# Patient Record
Sex: FEMALE | Race: BLACK OR AFRICAN AMERICAN | NOT HISPANIC OR LATINO | URBAN - METROPOLITAN AREA
[De-identification: names, ages, dates, MRNs, and addresses within clinical notes are randomized per-mention and may not be internally consistent; named-entity substitution may affect disease eponyms.]

---

## 2017-02-08 ENCOUNTER — EMERGENCY (EMERGENCY)
Facility: HOSPITAL | Age: 52
LOS: 1 days | Discharge: HOME | End: 2017-02-08
Attending: EMERGENCY MEDICINE
Payer: COMMERCIAL

## 2017-02-08 VITALS
HEIGHT: 65 IN | WEIGHT: 130.07 LBS | DIASTOLIC BLOOD PRESSURE: 60 MMHG | HEART RATE: 70 BPM | TEMPERATURE: 98 F | RESPIRATION RATE: 20 BRPM | OXYGEN SATURATION: 100 % | SYSTOLIC BLOOD PRESSURE: 139 MMHG

## 2017-02-08 DIAGNOSIS — F17.210 NICOTINE DEPENDENCE, CIGARETTES, UNCOMPLICATED: ICD-10-CM

## 2017-02-08 DIAGNOSIS — R10.84 GENERALIZED ABDOMINAL PAIN: ICD-10-CM

## 2017-02-08 DIAGNOSIS — R11.2 NAUSEA WITH VOMITING, UNSPECIFIED: ICD-10-CM

## 2017-02-08 LAB
ALBUMIN SERPL ELPH-MCNC: 4.9 G/DL — SIGNIFICANT CHANGE UP (ref 3.3–5.2)
ALP SERPL-CCNC: 73 U/L — SIGNIFICANT CHANGE UP (ref 40–120)
ALT FLD-CCNC: 24 U/L — SIGNIFICANT CHANGE UP
ANION GAP SERPL CALC-SCNC: 22 MMOL/L — HIGH (ref 5–17)
AST SERPL-CCNC: 34 U/L — HIGH
BASOPHILS # BLD AUTO: 0 K/UL — SIGNIFICANT CHANGE UP (ref 0–0.2)
BASOPHILS NFR BLD AUTO: 0.2 % — SIGNIFICANT CHANGE UP (ref 0–2)
BILIRUB SERPL-MCNC: 0.3 MG/DL — LOW (ref 0.4–2)
BUN SERPL-MCNC: 16 MG/DL — SIGNIFICANT CHANGE UP (ref 8–20)
CALCIUM SERPL-MCNC: 10.3 MG/DL — HIGH (ref 8.6–10.2)
CHLORIDE SERPL-SCNC: 103 MMOL/L — SIGNIFICANT CHANGE UP (ref 98–107)
CO2 SERPL-SCNC: 23 MMOL/L — SIGNIFICANT CHANGE UP (ref 22–29)
CREAT SERPL-MCNC: 0.66 MG/DL — SIGNIFICANT CHANGE UP (ref 0.5–1.3)
GLUCOSE SERPL-MCNC: 131 MG/DL — HIGH (ref 70–115)
HCG SERPL-ACNC: <2 MIU/ML — SIGNIFICANT CHANGE UP
HCT VFR BLD CALC: 42 % — SIGNIFICANT CHANGE UP (ref 37–47)
HGB BLD-MCNC: 14 G/DL — SIGNIFICANT CHANGE UP (ref 12–16)
LIDOCAIN IGE QN: 24 U/L — SIGNIFICANT CHANGE UP (ref 22–51)
LYMPHOCYTES # BLD AUTO: 1.8 K/UL — SIGNIFICANT CHANGE UP (ref 1–4.8)
LYMPHOCYTES # BLD AUTO: 11.3 % — LOW (ref 20–55)
MCHC RBC-ENTMCNC: 30.4 PG — SIGNIFICANT CHANGE UP (ref 27–31)
MCHC RBC-ENTMCNC: 33.3 G/DL — SIGNIFICANT CHANGE UP (ref 32–36)
MCV RBC AUTO: 91.1 FL — SIGNIFICANT CHANGE UP (ref 81–99)
MONOCYTES # BLD AUTO: 1.2 K/UL — HIGH (ref 0–0.8)
MONOCYTES NFR BLD AUTO: 7.4 % — SIGNIFICANT CHANGE UP (ref 3–10)
NEUTROPHILS # BLD AUTO: 12.9 K/UL — HIGH (ref 1.8–8)
NEUTROPHILS NFR BLD AUTO: 80.6 % — HIGH (ref 37–73)
PLATELET # BLD AUTO: 273 K/UL — SIGNIFICANT CHANGE UP (ref 150–400)
POTASSIUM SERPL-MCNC: 4.4 MMOL/L — SIGNIFICANT CHANGE UP (ref 3.5–5.3)
POTASSIUM SERPL-SCNC: 4.4 MMOL/L — SIGNIFICANT CHANGE UP (ref 3.5–5.3)
PROT SERPL-MCNC: 9 G/DL — HIGH (ref 6.6–8.7)
RBC # BLD: 4.61 M/UL — SIGNIFICANT CHANGE UP (ref 4.4–5.2)
RBC # FLD: 14.1 % — SIGNIFICANT CHANGE UP (ref 11–15.6)
SODIUM SERPL-SCNC: 148 MMOL/L — HIGH (ref 135–145)
WBC # BLD: 15.95 K/UL — HIGH (ref 4.8–10.8)
WBC # FLD AUTO: 15.95 K/UL — HIGH (ref 4.8–10.8)

## 2017-02-08 PROCEDURE — 74177 CT ABD & PELVIS W/CONTRAST: CPT | Mod: 26

## 2017-02-08 PROCEDURE — 99285 EMERGENCY DEPT VISIT HI MDM: CPT

## 2017-02-08 RX ORDER — SODIUM CHLORIDE 9 MG/ML
1000 INJECTION INTRAMUSCULAR; INTRAVENOUS; SUBCUTANEOUS ONCE
Qty: 0 | Refills: 0 | Status: COMPLETED | OUTPATIENT
Start: 2017-02-08 | End: 2017-02-08

## 2017-02-08 RX ORDER — MORPHINE SULFATE 50 MG/1
4 CAPSULE, EXTENDED RELEASE ORAL ONCE
Qty: 0 | Refills: 0 | Status: DISCONTINUED | OUTPATIENT
Start: 2017-02-08 | End: 2017-02-08

## 2017-02-08 RX ORDER — ONDANSETRON 8 MG/1
4 TABLET, FILM COATED ORAL ONCE
Qty: 0 | Refills: 0 | Status: COMPLETED | OUTPATIENT
Start: 2017-02-08 | End: 2017-02-08

## 2017-02-08 RX ORDER — PANTOPRAZOLE SODIUM 20 MG/1
40 TABLET, DELAYED RELEASE ORAL ONCE
Qty: 0 | Refills: 0 | Status: COMPLETED | OUTPATIENT
Start: 2017-02-08 | End: 2017-02-08

## 2017-02-08 RX ORDER — KETOROLAC TROMETHAMINE 30 MG/ML
15 SYRINGE (ML) INJECTION ONCE
Qty: 0 | Refills: 0 | Status: DISCONTINUED | OUTPATIENT
Start: 2017-02-08 | End: 2017-02-08

## 2017-02-08 RX ORDER — METOCLOPRAMIDE HCL 10 MG
10 TABLET ORAL ONCE
Qty: 0 | Refills: 0 | Status: COMPLETED | OUTPATIENT
Start: 2017-02-08 | End: 2017-02-08

## 2017-02-08 RX ADMIN — PANTOPRAZOLE SODIUM 40 MILLIGRAM(S): 20 TABLET, DELAYED RELEASE ORAL at 21:45

## 2017-02-08 RX ADMIN — SODIUM CHLORIDE 1000 MILLILITER(S): 9 INJECTION INTRAMUSCULAR; INTRAVENOUS; SUBCUTANEOUS at 21:12

## 2017-02-08 RX ADMIN — MORPHINE SULFATE 4 MILLIGRAM(S): 50 CAPSULE, EXTENDED RELEASE ORAL at 21:46

## 2017-02-08 RX ADMIN — MORPHINE SULFATE 4 MILLIGRAM(S): 50 CAPSULE, EXTENDED RELEASE ORAL at 21:11

## 2017-02-08 RX ADMIN — Medication 10 MILLIGRAM(S): at 21:11

## 2017-02-08 NOTE — ED PROVIDER NOTE - PROGRESS NOTE DETAILS
pain and nausea improved given PO challenge which was tolerated. Likely gastro. Will DC home with antiemetics. Return precautions given.

## 2017-02-08 NOTE — ED PROVIDER NOTE - PLAN OF CARE
You were seen and evaluated in the emergency department for nausea and vomiting and urinary frequency. You had a thorough evaluation including an exam, labs and imaging. You were given medications for comfort. Your workup did not demonstrate any concerning findings. You do not have a urinary tract infection. Most likely you have a stomach virus that will take several days to resolve. Please read the attached information sheets as they will provide useful information regarding your condition.    It is important to understand that while your workup was reassuring, no workup can completely exclude all concerning conditions. Therfore, please return if you develop worsening or concerning new symptoms including worsening pain, pain that spreads to new places, inability to tolerate an oral diet, new and worsening fevers and chills, weakness, inability to pass stool or flatulent gas, those included on the attached information sheets, or other findings concerning to you. Please take all your medications as prescribed.    Drink plenty of fluids. It is ok to have some vomiting and diarrhea as long as you are able to ekep down fluids and replace the fluids you have lost.  Please be sure to follow up with your regular doctor in the next 2 days.

## 2017-02-08 NOTE — ED STATDOCS - PROGRESS NOTE DETAILS
52 y/o F pt w/ no significant PMHx presents to the ED c/o N/V/D and abd pain onset today morning. Pt states that she has been vomiting greenish/bile. Pt notes going out drinking yesterday night. Pt denies fever, chills, or any other complaints. NKDA. Pt tender to epigastric region. R/o pancreatitis. Focused evaluation, protocol orders entered, placed in main for complete evaluation by another provider.

## 2017-02-08 NOTE — ED PROVIDER NOTE - OBJECTIVE STATEMENT
This is a 52 y/o F with no medical history presenting to the ED complaining of N/V/D since this morning. Pt states that she does not have abdominal pain but notes nausea. Denies coffee ground emesis, blood in vomit or stool. She also notes that she has the urge to urinate without burning. LNMP October. Admits to drinking EtOH last night, smoking marijuana, and cigarette use. This is a 52 y/o F with no medical history presenting to the ED complaining of N/V/D since this morning. Pt states that she does not have abdominal pain (contrast to statdocs/triage note) but notes nausea. Was drinking night before episode. Denies coffee ground emesis, blood in vomit or stool. She also notes that she has the urge to urinate without burning. LNMP October. Admits to drinking EtOH last night, smoking marijuana, and cigarette use.

## 2017-02-08 NOTE — ED PROVIDER NOTE - NS ED MD SCRIBE ATTENDING SCRIBE SECTIONS
HISTORY OF PRESENT ILLNESS/PHYSICAL EXAM/HIV/PAST MEDICAL/SURGICAL/SOCIAL HISTORY/REVIEW OF SYSTEMS/INTAKE ASSESSMENT/SCREENINGS/VITAL SIGNS( Pullset)/PROGRESS NOTE/DISPOSITION

## 2017-02-08 NOTE — ED PROVIDER NOTE - MEDICAL DECISION MAKING DETAILS
possible gastro, labs reassuring, will give Zofran, Zoradol, post menopausal so no b-HCG. Pt also reports urinary frequency so will UA to r/o UTI. As an anion gap, likely ketosis will give pt sugar. possible gastro, labs/imaging reassuring, will give Zofran, Zoradol, post menopausal so no b-HCG needed. Pt also reports urinary frequency so will UA to r/o UTI. As an anion gap, likely ketosis will give pt sugar.

## 2017-02-08 NOTE — ED PROVIDER NOTE - CARE PLAN
Principal Discharge DX:	Generalized abdominal pain  Instructions for follow-up, activity and diet:	You were seen and evaluated in the emergency department for nausea and vomiting and urinary frequency. You had a thorough evaluation including an exam, labs and imaging. You were given medications for comfort. Your workup did not demonstrate any concerning findings. You do not have a urinary tract infection. Most likely you have a stomach virus that will take several days to resolve. Please read the attached information sheets as they will provide useful information regarding your condition.    It is important to understand that while your workup was reassuring, no workup can completely exclude all concerning conditions. Therfore, please return if you develop worsening or concerning new symptoms including worsening pain, pain that spreads to new places, inability to tolerate an oral diet, new and worsening fevers and chills, weakness, inability to pass stool or flatulent gas, those included on the attached information sheets, or other findings concerning to you. Please take all your medications as prescribed.    Drink plenty of fluids. It is ok to have some vomiting and diarrhea as long as you are able to ekep down fluids and replace the fluids you have lost.  Please be sure to follow up with your regular doctor in the next 2 days.  Secondary Diagnosis:	Vomiting and diarrhea

## 2017-02-08 NOTE — ED PROVIDER NOTE - NORMAL STATEMENT, MLM
Airway patent, nasal mucosa clear, mouth with dry mucosa. Throat has no vesicles, no oropharyngeal exudates and uvula is midline. Clear tympanic membranes bilaterally.

## 2017-02-08 NOTE — ED PROVIDER NOTE - ABDOMINAL EXAM
mild abdominal tenderness greater in the suprapubic region. No rebound, guarding, no McBurney's sign, no Walton's sign very  mild diffuse abdominal tenderness that is most notable in the suprapubic region. No rebound, guarding, no McBurney's sign, no Walton's sign. Repeat exams show the point of maximum tenderness to move but tenderness is always minimal.

## 2017-02-08 NOTE — ED PROVIDER NOTE - DETAILS:
I, Maximus Oshea, performed the initial face to face bedside interview with this patient regarding history of present illness, review of symptoms and relevant past medical, social and family history.  I completed an independent physical examination.  I was the initial provider who evaluated this patient. The history, relevant review of systems, past medical and surgical history, medical decision making, and physical examination was documented by the scribe in my presence and I attest to the accuracy of the documentation.

## 2017-02-09 VITALS
TEMPERATURE: 98 F | HEART RATE: 72 BPM | RESPIRATION RATE: 18 BRPM | DIASTOLIC BLOOD PRESSURE: 53 MMHG | OXYGEN SATURATION: 97 % | SYSTOLIC BLOOD PRESSURE: 89 MMHG

## 2017-02-09 LAB
APPEARANCE UR: CLEAR — SIGNIFICANT CHANGE UP
BILIRUB UR-MCNC: NEGATIVE — SIGNIFICANT CHANGE UP
COLOR SPEC: YELLOW — SIGNIFICANT CHANGE UP
DIFF PNL FLD: ABNORMAL
EPI CELLS # UR: SIGNIFICANT CHANGE UP
GLUCOSE UR QL: NEGATIVE MG/DL — SIGNIFICANT CHANGE UP
KETONES UR-MCNC: ABNORMAL
LEUKOCYTE ESTERASE UR-ACNC: ABNORMAL
NITRITE UR-MCNC: NEGATIVE — SIGNIFICANT CHANGE UP
PH UR: 5 — SIGNIFICANT CHANGE UP (ref 4.8–8)
PROT UR-MCNC: 30 MG/DL
RBC CASTS # UR COMP ASSIST: SIGNIFICANT CHANGE UP /HPF (ref 0–4)
SP GR SPEC: 1.01 — SIGNIFICANT CHANGE UP (ref 1.01–1.02)
UROBILINOGEN FLD QL: NEGATIVE MG/DL — SIGNIFICANT CHANGE UP
WBC UR QL: SIGNIFICANT CHANGE UP

## 2017-02-09 PROCEDURE — 83690 ASSAY OF LIPASE: CPT

## 2017-02-09 PROCEDURE — 74177 CT ABD & PELVIS W/CONTRAST: CPT

## 2017-02-09 PROCEDURE — 96375 TX/PRO/DX INJ NEW DRUG ADDON: CPT | Mod: XU

## 2017-02-09 PROCEDURE — 85027 COMPLETE CBC AUTOMATED: CPT

## 2017-02-09 PROCEDURE — 81001 URINALYSIS AUTO W/SCOPE: CPT

## 2017-02-09 PROCEDURE — 96374 THER/PROPH/DIAG INJ IV PUSH: CPT | Mod: XU

## 2017-02-09 PROCEDURE — 80053 COMPREHEN METABOLIC PANEL: CPT

## 2017-02-09 PROCEDURE — 99284 EMERGENCY DEPT VISIT MOD MDM: CPT | Mod: 25

## 2017-02-09 PROCEDURE — 84702 CHORIONIC GONADOTROPIN TEST: CPT

## 2017-02-09 PROCEDURE — 87086 URINE CULTURE/COLONY COUNT: CPT

## 2017-02-09 RX ORDER — ONDANSETRON 8 MG/1
1 TABLET, FILM COATED ORAL
Qty: 9 | Refills: 0 | OUTPATIENT
Start: 2017-02-09 | End: 2017-02-12

## 2017-02-09 RX ADMIN — Medication 15 MILLIGRAM(S): at 01:46

## 2017-02-09 RX ADMIN — SODIUM CHLORIDE 2000 MILLILITER(S): 9 INJECTION INTRAMUSCULAR; INTRAVENOUS; SUBCUTANEOUS at 00:50

## 2017-02-09 RX ADMIN — ONDANSETRON 4 MILLIGRAM(S): 8 TABLET, FILM COATED ORAL at 00:50

## 2017-02-09 RX ADMIN — Medication 15 MILLIGRAM(S): at 00:50

## 2017-02-10 LAB
CULTURE RESULTS: SIGNIFICANT CHANGE UP
SPECIMEN SOURCE: SIGNIFICANT CHANGE UP

## 2022-12-08 NOTE — ED ADULT NURSE NOTE - AS SC BRADEN ACTIVITY
Patient presents for initial evaluation.  She reports a long history of abdominal discomfort as well as weight loss.  Pain is described as generalized.  Crampy in nature.  There is no obvious precipitant.  Does not seem to be related to meals nor defecation.  Can happen all day long or intermittently throughout the day.  She is accompanied by her mother.  She is down to 83 pounds.  Her mom states that she is always been small but this is the worst she is ever been.  It has been attributed to being a "picky eater" in the past.  On questioning the patient of what she likes to eat she gives me an answer of may be chicken tenders and pizza but seems to be struggling to find an answer.  She tells me she is not afraid to eat.  Food does go down.  She was very active in high school.  She is now lost her menstrual cycle.  She is not had any work-up for these issues in the past.  She does acknowledge anxiety but denies depression.  Her bowel movements are regular.  She denies any blood in the stool.
(4) walks frequently

## 2023-02-06 ENCOUNTER — EMERGENCY (EMERGENCY)
Facility: HOSPITAL | Age: 58
LOS: 1 days | Discharge: HOME | End: 2023-02-06
Attending: EMERGENCY MEDICINE
Payer: COMMERCIAL

## 2023-02-06 VITALS
RESPIRATION RATE: 18 BRPM | OXYGEN SATURATION: 100 % | HEART RATE: 73 BPM | HEIGHT: 65 IN | SYSTOLIC BLOOD PRESSURE: 125 MMHG | TEMPERATURE: 98 F | DIASTOLIC BLOOD PRESSURE: 68 MMHG | WEIGHT: 134.92 LBS

## 2023-02-06 VITALS
DIASTOLIC BLOOD PRESSURE: 86 MMHG | RESPIRATION RATE: 18 BRPM | HEART RATE: 78 BPM | OXYGEN SATURATION: 100 % | TEMPERATURE: 98 F | SYSTOLIC BLOOD PRESSURE: 160 MMHG

## 2023-02-06 LAB
ALBUMIN SERPL ELPH-MCNC: 5 G/DL — SIGNIFICANT CHANGE UP (ref 3.3–5.2)
ALP SERPL-CCNC: 89 U/L — SIGNIFICANT CHANGE UP (ref 40–120)
ALT FLD-CCNC: 14 U/L — SIGNIFICANT CHANGE UP
ANION GAP SERPL CALC-SCNC: 23 MMOL/L — HIGH (ref 5–17)
AST SERPL-CCNC: 30 U/L — SIGNIFICANT CHANGE UP
BASOPHILS # BLD AUTO: 0.11 K/UL — SIGNIFICANT CHANGE UP (ref 0–0.2)
BASOPHILS NFR BLD AUTO: 0.6 % — SIGNIFICANT CHANGE UP (ref 0–2)
BILIRUB SERPL-MCNC: 0.4 MG/DL — SIGNIFICANT CHANGE UP (ref 0.4–2)
BUN SERPL-MCNC: 21.5 MG/DL — HIGH (ref 8–20)
CALCIUM SERPL-MCNC: 10.5 MG/DL — SIGNIFICANT CHANGE UP (ref 8.4–10.5)
CHLORIDE SERPL-SCNC: 103 MMOL/L — SIGNIFICANT CHANGE UP (ref 96–108)
CO2 SERPL-SCNC: 15 MMOL/L — LOW (ref 22–29)
CREAT SERPL-MCNC: 0.71 MG/DL — SIGNIFICANT CHANGE UP (ref 0.5–1.3)
EGFR: 100 ML/MIN/1.73M2 — SIGNIFICANT CHANGE UP
EOSINOPHIL # BLD AUTO: 0 K/UL — SIGNIFICANT CHANGE UP (ref 0–0.5)
EOSINOPHIL NFR BLD AUTO: 0 % — SIGNIFICANT CHANGE UP (ref 0–6)
GLUCOSE SERPL-MCNC: 126 MG/DL — HIGH (ref 70–99)
HCT VFR BLD CALC: 46.4 % — HIGH (ref 34.5–45)
HGB BLD-MCNC: 15 G/DL — SIGNIFICANT CHANGE UP (ref 11.5–15.5)
IMM GRANULOCYTES NFR BLD AUTO: 1.3 % — HIGH (ref 0–0.9)
LIDOCAIN IGE QN: 30 U/L — SIGNIFICANT CHANGE UP (ref 22–51)
LYMPHOCYTES # BLD AUTO: 14.4 % — SIGNIFICANT CHANGE UP (ref 13–44)
LYMPHOCYTES # BLD AUTO: 2.6 K/UL — SIGNIFICANT CHANGE UP (ref 1–3.3)
MCHC RBC-ENTMCNC: 29.6 PG — SIGNIFICANT CHANGE UP (ref 27–34)
MCHC RBC-ENTMCNC: 32.3 GM/DL — SIGNIFICANT CHANGE UP (ref 32–36)
MCV RBC AUTO: 91.5 FL — SIGNIFICANT CHANGE UP (ref 80–100)
MONOCYTES # BLD AUTO: 1.2 K/UL — HIGH (ref 0–0.9)
MONOCYTES NFR BLD AUTO: 6.6 % — SIGNIFICANT CHANGE UP (ref 2–14)
NEUTROPHILS # BLD AUTO: 13.96 K/UL — HIGH (ref 1.8–7.4)
NEUTROPHILS NFR BLD AUTO: 77.1 % — HIGH (ref 43–77)
PLATELET # BLD AUTO: 293 K/UL — SIGNIFICANT CHANGE UP (ref 150–400)
POTASSIUM SERPL-MCNC: 4.5 MMOL/L — SIGNIFICANT CHANGE UP (ref 3.5–5.3)
POTASSIUM SERPL-SCNC: 4.5 MMOL/L — SIGNIFICANT CHANGE UP (ref 3.5–5.3)
PROT SERPL-MCNC: 8.8 G/DL — HIGH (ref 6.6–8.7)
RBC # BLD: 5.07 M/UL — SIGNIFICANT CHANGE UP (ref 3.8–5.2)
RBC # FLD: 13.7 % — SIGNIFICANT CHANGE UP (ref 10.3–14.5)
SODIUM SERPL-SCNC: 141 MMOL/L — SIGNIFICANT CHANGE UP (ref 135–145)
TROPONIN T SERPL-MCNC: <0.01 NG/ML — SIGNIFICANT CHANGE UP (ref 0–0.06)
WBC # BLD: 18.11 K/UL — HIGH (ref 3.8–10.5)
WBC # FLD AUTO: 18.11 K/UL — HIGH (ref 3.8–10.5)

## 2023-02-06 PROCEDURE — 96361 HYDRATE IV INFUSION ADD-ON: CPT

## 2023-02-06 PROCEDURE — 96374 THER/PROPH/DIAG INJ IV PUSH: CPT

## 2023-02-06 PROCEDURE — 99285 EMERGENCY DEPT VISIT HI MDM: CPT

## 2023-02-06 PROCEDURE — 93010 ELECTROCARDIOGRAM REPORT: CPT

## 2023-02-06 PROCEDURE — 85025 COMPLETE CBC W/AUTO DIFF WBC: CPT

## 2023-02-06 PROCEDURE — 84484 ASSAY OF TROPONIN QUANT: CPT

## 2023-02-06 PROCEDURE — 93005 ELECTROCARDIOGRAM TRACING: CPT

## 2023-02-06 PROCEDURE — 83690 ASSAY OF LIPASE: CPT

## 2023-02-06 PROCEDURE — 80053 COMPREHEN METABOLIC PANEL: CPT

## 2023-02-06 PROCEDURE — 99284 EMERGENCY DEPT VISIT MOD MDM: CPT | Mod: 25

## 2023-02-06 PROCEDURE — 96375 TX/PRO/DX INJ NEW DRUG ADDON: CPT

## 2023-02-06 PROCEDURE — 96376 TX/PRO/DX INJ SAME DRUG ADON: CPT

## 2023-02-06 PROCEDURE — 36415 COLL VENOUS BLD VENIPUNCTURE: CPT

## 2023-02-06 RX ORDER — SODIUM CHLORIDE 9 MG/ML
1000 INJECTION INTRAMUSCULAR; INTRAVENOUS; SUBCUTANEOUS ONCE
Refills: 0 | Status: COMPLETED | OUTPATIENT
Start: 2023-02-06 | End: 2023-02-06

## 2023-02-06 RX ORDER — MORPHINE SULFATE 50 MG/1
4 CAPSULE, EXTENDED RELEASE ORAL ONCE
Refills: 0 | Status: DISCONTINUED | OUTPATIENT
Start: 2023-02-06 | End: 2023-02-06

## 2023-02-06 RX ORDER — ONDANSETRON 8 MG/1
4 TABLET, FILM COATED ORAL ONCE
Refills: 0 | Status: COMPLETED | OUTPATIENT
Start: 2023-02-06 | End: 2023-02-06

## 2023-02-06 RX ADMIN — ONDANSETRON 4 MILLIGRAM(S): 8 TABLET, FILM COATED ORAL at 15:51

## 2023-02-06 RX ADMIN — MORPHINE SULFATE 4 MILLIGRAM(S): 50 CAPSULE, EXTENDED RELEASE ORAL at 17:35

## 2023-02-06 RX ADMIN — SODIUM CHLORIDE 1000 MILLILITER(S): 9 INJECTION INTRAMUSCULAR; INTRAVENOUS; SUBCUTANEOUS at 15:51

## 2023-02-06 RX ADMIN — MORPHINE SULFATE 4 MILLIGRAM(S): 50 CAPSULE, EXTENDED RELEASE ORAL at 15:51

## 2023-02-06 RX ADMIN — ONDANSETRON 4 MILLIGRAM(S): 8 TABLET, FILM COATED ORAL at 17:49

## 2023-02-06 NOTE — ED ADULT NURSE NOTE - OBJECTIVE STATEMENT
pt received in supertrack.  Patient A&Ox4 complaining of mid abdominal pain, N/V since this morning.  Pt diaphoretic, ashy in color and restless, sticking fingers down throat to promote vomiting. EKG obtained and given to MD Crowell and MD Rubin. Denies CP, SOB, fever. +chills.   NAD noted, respirations even and unlabored.  Safety precautions in place.  Plan of care explained, pt verbalized understanding.

## 2023-02-06 NOTE — ED STATDOCS - MDM ORDERS SUBMITTED SELECTION
Immediate Brief Procedure Note    Patient Name:  Aisha Ken    YOB: 1956    DATE OF PROCEDURE : 7/13/2020    PROCEDURALIST: MD Abel Irene DO    ASSISTANT(S):  None    ANESTHESIA TYPE:  Moderate sedation  .  ANESTHESIOLOGIST:  None    PROCEDURE PERFORMED: CT guided aortocaval lymph node biopsy.     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   • Burris's esophagus without dysplasia   • GERD (gastroesophageal reflux disease)   • Mixed hyperlipidemia   • Dysarthria due to acute cerebellar cerebrovascular accident (CVA) (CMS/HCC)   • Hemiparesis (CMS/HCC)   • Acute CVA (cerebrovascular accident) (CMS/HCC)   • Stroke (cerebrum) (CMS/HCC)   • Right shoulder pain   • Hypercalcemia   • Vitamin D deficiency   • UTI (urinary tract infection)   • Pseudobulbar affect   • Lymphadenopathy       Post-procedure Dx: Same    Findings: Technically successful CT guided aortocaval lymph node biopsy     Estimated Blood Loss: Less than 5 ml.    Complications: None.    Specimens Removed: Yes     Labs/Medications

## 2023-02-06 NOTE — ED STATDOCS - CLINICAL SUMMARY MEDICAL DECISION MAKING FREE TEXT BOX
58 y/o female presents to ED c/o for eval of abdominal discomfort with n/v began this morning. Pt has Hx in past previously attributed to anxiety.  On exam pt is diffusely tender to palpation. HR not elevated despite n/v. Per review of records from [facility], pt has cyclic vomiting syndrome and is treated with antiemetics. Will give antiemetic, lipase with basic labs, may warrant imaging, otherwise stable for OP followup as this is a reoccurring issue.

## 2023-02-06 NOTE — ED STATDOCS - PROGRESS NOTE DETAILS
Samantha PGY3: Patient improved clinically. She is tolerating PO intake at this time. Abdominal exam benign. Return precautions discussed with patient. Patient understood return precautions.

## 2023-02-06 NOTE — ED STATDOCS - NSFOLLOWUPCLINICS_GEN_ALL_ED_FT
[address] [place] [address]  Phone: [phone]  Fax:   Follow Up Time: Urgent

## 2023-02-06 NOTE — ED STATDOCS - ATTENDING CONTRIBUTION TO CARE
DOROTHY Gross: see mdm    I have personally performed a face to face diagnostic evaluation on this patient.  I have reviewed the resident's note and agree with the history, exam, and plan of care, except as noted.   My medical decision making and observations are found above.

## 2023-02-06 NOTE — ED STATDOCS - OBJECTIVE STATEMENT
58 y/o female with PMHx of presents to ED c/o vomiting, diarrhea, diffuse abdominal pain and nausea starting today. Pt denies fever, dysuria, SOB, or chest pain. Pt states she has had an episode similar to this before; is unsure what it is. Pt states she believes she is having these symptoms from stress. Allergy to Oxycontin and percocet; makes pt feel like things are crawling on her.
MR (mitral regurgitation)  05/04/2017    Active  Liss Herrera

## 2023-02-06 NOTE — ED STATDOCS - PATIENT PORTAL LINK FT
You can access the FollowMyHealth Patient Portal offered by [facility] by registering at the following website: [email or web address]. By joining [facility]’s FollowMyHealth portal, you will also be able to view your health information using other applications (apps) compatible with our system.

## 2023-02-06 NOTE — ED ADULT NURSE NOTE - CAS EDN DISCHARGE ASSESSMENT
Attempted to call pt number, it was invalid. Tried calling pt daughter and left message for pt to call clinic to get rescheduled.   
Alert and oriented to person, place and time

## 2023-02-06 NOTE — ED STATDOCS - PHYSICAL EXAMINATION
Gen: NAD, non-toxic, conversational. in painful distress.  Eyes: PERRLA, EOMI   HENT: Normocephalic, atraumatic. External ears normal, no rhinorrhea, moist mucous membranes.   CV: RRR, no M/R/G  Resp: CTAB, non-labored, speaking without difficulty on room air  Abd: soft, diffusely tender to palpation. non rigid, no guarding or rebound tenderness  Back: No CVAT bilaterally, no midline ttp  Skin: dry, wwp   Neuro: AOx3, speech is fluent and appropriate  Psych: Mood normal, affect euthymic

## 2023-02-06 NOTE — ED STATDOCS - NS ED ROS FT
General: No fevers / chills  HENT: No ear pain, runny nose, or sore throat  Eyes: No visual changes  CP: No chest pain, palpitations, or light headedness  Resp: No shortness of breath, no cough  GI: No constipation +abdominal pain, vomiting, nausea, diarrhea  : No dysuria or hematuria  Neuro: No numbness, tingling, or weakness  Endo: No hx of diabetes  Heme: No hx of easy bleeding or bruising

## 2023-02-06 NOTE — ED STATDOCS - ADDITIONAL NOTES AND INSTRUCTIONS:
Please excuse Zarina Sadler from work through 2/8/23. She was seen in the ED for a medical condition.

## 2023-05-26 ENCOUNTER — EMERGENCY (EMERGENCY)
Facility: HOSPITAL | Age: 58
LOS: 1 days | Discharge: HOME | End: 2023-05-26
Attending: EMERGENCY MEDICINE
Payer: COMMERCIAL

## 2023-05-26 VITALS
RESPIRATION RATE: 18 BRPM | HEART RATE: 69 BPM | TEMPERATURE: 98 F | DIASTOLIC BLOOD PRESSURE: 85 MMHG | HEIGHT: 65 IN | SYSTOLIC BLOOD PRESSURE: 139 MMHG | OXYGEN SATURATION: 100 % | WEIGHT: 134.92 LBS

## 2023-05-26 LAB
ALBUMIN SERPL ELPH-MCNC: 4.6 G/DL — SIGNIFICANT CHANGE UP (ref 3.3–5.2)
ALP SERPL-CCNC: 90 U/L — SIGNIFICANT CHANGE UP (ref 40–120)
ALT FLD-CCNC: 20 U/L — SIGNIFICANT CHANGE UP
ANION GAP SERPL CALC-SCNC: 16 MMOL/L — SIGNIFICANT CHANGE UP (ref 5–17)
AST SERPL-CCNC: 27 U/L — SIGNIFICANT CHANGE UP
BASOPHILS # BLD AUTO: 0.09 K/UL — SIGNIFICANT CHANGE UP (ref 0–0.2)
BASOPHILS NFR BLD AUTO: 0.6 % — SIGNIFICANT CHANGE UP (ref 0–2)
BILIRUB SERPL-MCNC: 0.4 MG/DL — SIGNIFICANT CHANGE UP (ref 0.4–2)
BUN SERPL-MCNC: 18.7 MG/DL — SIGNIFICANT CHANGE UP (ref 8–20)
CALCIUM SERPL-MCNC: 9.9 MG/DL — SIGNIFICANT CHANGE UP (ref 8.4–10.5)
CHLORIDE SERPL-SCNC: 105 MMOL/L — SIGNIFICANT CHANGE UP (ref 96–108)
CO2 SERPL-SCNC: 21 MMOL/L — LOW (ref 22–29)
CREAT SERPL-MCNC: 0.69 MG/DL — SIGNIFICANT CHANGE UP (ref 0.5–1.3)
EGFR: 101 ML/MIN/1.73M2 — SIGNIFICANT CHANGE UP
EOSINOPHIL # BLD AUTO: 0.07 K/UL — SIGNIFICANT CHANGE UP (ref 0–0.5)
EOSINOPHIL NFR BLD AUTO: 0.5 % — SIGNIFICANT CHANGE UP (ref 0–6)
GLUCOSE SERPL-MCNC: 124 MG/DL — HIGH (ref 70–99)
HCT VFR BLD CALC: 48 % — HIGH (ref 34.5–45)
HGB BLD-MCNC: 15.3 G/DL — SIGNIFICANT CHANGE UP (ref 11.5–15.5)
IMM GRANULOCYTES NFR BLD AUTO: 1.8 % — HIGH (ref 0–0.9)
LIDOCAIN IGE QN: 28 U/L — SIGNIFICANT CHANGE UP (ref 22–51)
LYMPHOCYTES # BLD AUTO: 19 % — SIGNIFICANT CHANGE UP (ref 13–44)
LYMPHOCYTES # BLD AUTO: 2.92 K/UL — SIGNIFICANT CHANGE UP (ref 1–3.3)
MAGNESIUM SERPL-MCNC: 1.8 MG/DL — SIGNIFICANT CHANGE UP (ref 1.6–2.6)
MCHC RBC-ENTMCNC: 29.2 PG — SIGNIFICANT CHANGE UP (ref 27–34)
MCHC RBC-ENTMCNC: 31.9 GM/DL — LOW (ref 32–36)
MCV RBC AUTO: 91.6 FL — SIGNIFICANT CHANGE UP (ref 80–100)
MONOCYTES # BLD AUTO: 0.75 K/UL — SIGNIFICANT CHANGE UP (ref 0–0.9)
MONOCYTES NFR BLD AUTO: 4.9 % — SIGNIFICANT CHANGE UP (ref 2–14)
NEUTROPHILS # BLD AUTO: 11.28 K/UL — HIGH (ref 1.8–7.4)
NEUTROPHILS NFR BLD AUTO: 73.2 % — SIGNIFICANT CHANGE UP (ref 43–77)
PLATELET # BLD AUTO: 314 K/UL — SIGNIFICANT CHANGE UP (ref 150–400)
POTASSIUM SERPL-MCNC: 4.2 MMOL/L — SIGNIFICANT CHANGE UP (ref 3.5–5.3)
POTASSIUM SERPL-SCNC: 4.2 MMOL/L — SIGNIFICANT CHANGE UP (ref 3.5–5.3)
PROT SERPL-MCNC: 8.2 G/DL — SIGNIFICANT CHANGE UP (ref 6.6–8.7)
RBC # BLD: 5.24 M/UL — HIGH (ref 3.8–5.2)
RBC # FLD: 13.9 % — SIGNIFICANT CHANGE UP (ref 10.3–14.5)
SODIUM SERPL-SCNC: 142 MMOL/L — SIGNIFICANT CHANGE UP (ref 135–145)
WBC # BLD: 15.39 K/UL — HIGH (ref 3.8–10.5)
WBC # FLD AUTO: 15.39 K/UL — HIGH (ref 3.8–10.5)

## 2023-05-26 PROCEDURE — 99285 EMERGENCY DEPT VISIT HI MDM: CPT

## 2023-05-26 PROCEDURE — 74019 RADEX ABDOMEN 2 VIEWS: CPT | Mod: 26

## 2023-05-26 RX ORDER — KETOROLAC TROMETHAMINE 30 MG/ML
15 SYRINGE (ML) INJECTION ONCE
Refills: 0 | Status: DISCONTINUED | OUTPATIENT
Start: 2023-05-26 | End: 2023-05-26

## 2023-05-26 RX ORDER — SODIUM CHLORIDE 9 MG/ML
1000 INJECTION INTRAMUSCULAR; INTRAVENOUS; SUBCUTANEOUS ONCE
Refills: 0 | Status: COMPLETED | OUTPATIENT
Start: 2023-05-26 | End: 2023-05-26

## 2023-05-26 RX ORDER — ONDANSETRON 8 MG/1
4 TABLET, FILM COATED ORAL ONCE
Refills: 0 | Status: COMPLETED | OUTPATIENT
Start: 2023-05-26 | End: 2023-05-26

## 2023-05-26 RX ORDER — MORPHINE SULFATE 50 MG/1
4 CAPSULE, EXTENDED RELEASE ORAL ONCE
Refills: 0 | Status: DISCONTINUED | OUTPATIENT
Start: 2023-05-26 | End: 2023-05-26

## 2023-05-26 RX ORDER — FAMOTIDINE 10 MG/ML
20 INJECTION INTRAVENOUS ONCE
Refills: 0 | Status: DISCONTINUED | OUTPATIENT
Start: 2023-05-26 | End: 2023-05-26

## 2023-05-26 RX ORDER — FAMOTIDINE 10 MG/ML
20 INJECTION INTRAVENOUS ONCE
Refills: 0 | Status: COMPLETED | OUTPATIENT
Start: 2023-05-26 | End: 2023-05-26

## 2023-05-26 RX ORDER — METOCLOPRAMIDE HCL 10 MG
10 TABLET ORAL ONCE
Refills: 0 | Status: COMPLETED | OUTPATIENT
Start: 2023-05-26 | End: 2023-05-26

## 2023-05-26 RX ADMIN — Medication 15 MILLIGRAM(S): at 22:57

## 2023-05-26 RX ADMIN — MORPHINE SULFATE 4 MILLIGRAM(S): 50 CAPSULE, EXTENDED RELEASE ORAL at 17:11

## 2023-05-26 RX ADMIN — Medication 15 MILLIGRAM(S): at 17:10

## 2023-05-26 RX ADMIN — ONDANSETRON 4 MILLIGRAM(S): 8 TABLET, FILM COATED ORAL at 17:10

## 2023-05-26 RX ADMIN — FAMOTIDINE 20 MILLIGRAM(S): 10 INJECTION INTRAVENOUS at 17:11

## 2023-05-26 RX ADMIN — SODIUM CHLORIDE 1000 MILLILITER(S): 9 INJECTION INTRAMUSCULAR; INTRAVENOUS; SUBCUTANEOUS at 17:10

## 2023-05-26 RX ADMIN — Medication 104 MILLIGRAM(S): at 19:55

## 2023-05-26 NOTE — ED PROVIDER NOTE - PATIENT PORTAL LINK FT
You can access the FollowMyHealth Patient Portal offered by [facility] by registering at the following website: [email or web address]. By joining [facility]’s FollowMyHealth portal, you will also be able to view your health information using other applications (apps) compatible with our system.

## 2023-05-26 NOTE — ED PROVIDER NOTE - NS ED ATTENDING STATEMENT MOD
I have seen and examined this patient and fully participated in the care of this patient as the teaching attending.  The service was shared with the CARRILLO.  I reviewed and verified the documentation and independently performed the documented:

## 2023-05-26 NOTE — ED PROVIDER NOTE - CLINICAL SUMMARY MEDICAL DECISION MAKING FREE TEXT BOX
Edwige FRANCO : received sign out from JOAN Fairchild @[id number] pending abd XR, US, meds and reassessment. US and XR w/o acute findings.Pt drank juice and immediately with return of pain and vomiting. Additional anti emetics ordered as well as CT abd/pelvis which ultimately came back w/o acute findings. Pt still nausea and vomited again. Admits to marijuana use, per notes at [facility] hx cyclical vomiting. Pt also notes pt at work with vomiting yesterday before her sx began. ?gastroenteritis vs cyclical vomiting. Given IV haldol and subsequently feeling much better tolerating PO. stable for dc home discussed marijuana cessation and f/u PCP and GI

## 2023-05-26 NOTE — ED PROVIDER NOTE - NSFOLLOWUPINSTRUCTIONS_ED_ALL_ED_FT
Please follow up with primary care doctor in 2-3 days  Follow up with gastroenterology   Return to ER for any new or worsening symptoms      Nausea / Vomiting    Nausea is the feeling that you have to vomit. As nausea gets worse, it can lead to vomiting. Vomiting puts you at an increased risk for dehydration. Older adults and people with other diseases or a weak immune system are at higher risk for dehydration. Drink clear fluids in small but frequent amounts as tolerated. Eat bland, easy-to-digest foods in small amounts as tolerated.    SEEK IMMEDIATE MEDICAL CARE IF YOU HAVE ANY OF THE FOLLOWING SYMPTOMS: fever, inability to keep sufficient fluids down, black or bloody vomitus, black or bloody stools, lightheadedness/dizziness, chest pain, severe headache, rash, shortness of breath, cold or clammy skin, confusion, pain with urination, or any signs of dehydration.

## 2023-05-26 NOTE — ED PROVIDER NOTE - IV ALTEPLASE DOOR HIDDEN
Pt arrived to PACU,A&O,no c/o pain and/or numbness or tingling, vitals stable, site unremarkable
show

## 2023-05-26 NOTE — ED PROVIDER NOTE - ATTENDING CONTRIBUTION TO CARE
I agree with the PA's note and was available for any issues/concerns. I was directly involved in patient care. My brief overall assessment is as follows:    Pt with upper abd pain, nausea/vomiting. +leukocytosis negative imaging. improved and tolerating PO outpt Follow up

## 2023-05-26 NOTE — ED PROVIDER NOTE - PHYSICAL EXAMINATION
Gen: uncomfortable, AOx3  Head: NCAT  HEENT: EOMI, oral mucosa moist, normal conjunctiva, neck supple  Lung: CTAB, no respiratory distress  CV: rrr, no murmur, Normal perfusion  Abd: soft, mild epigastric and right upper quadrant ttp   MSK: No edema, no visible deformities  Neuro: No focal neurologic deficits  Skin: No rash   Psych: normal affect
ambulatory
Normal for race

## 2023-05-26 NOTE — ED ADULT TRIAGE NOTE - CHIEF COMPLAINT QUOTE
pt c/o abdominal pain w/ associated n/v since 1100 this AM. pt denies fevers or ha. pt shruti any known pmhx.

## 2023-05-26 NOTE — ED ADULT NURSE NOTE - OBJECTIVE STATEMENT
Pt care acquired at 1700. Pt is A&Ox4 and is resting in bed. Pt arrives at ed C/O Nausea and is dry heaving on assessment. Pt states symptoms started at 11 am. Pt denies taking anything for nausea. Pt denies sick contacts. Pt denies chest pain. Pt denies fevers. VSS.

## 2023-05-26 NOTE — ED PROVIDER NOTE - CARE PROVIDER_API CALL
Herbert Dennis  Gastroenterology  [address]  [place] [address]  Phone: [phone]  Fax: [phone]  Follow Up Time:

## 2023-05-26 NOTE — ED PROVIDER NOTE - OBJECTIVE STATEMENT
59yo F with N/V and mid upper abd pain since 11am, constant. vomit- NBNB. PSH- tubal ligation. hasn't taken anything at home for pain. +diarrhea nonbloody. no recent travel. no CP/SOB. no fevers. had one drink last night. no substance abuse

## 2023-05-26 NOTE — ED ADULT NURSE NOTE - NSFALLUNIVINTERV_ED_ALL_ED
Bed/Stretcher in lowest position, wheels locked, appropriate side rails in place/Call bell, personal items and telephone in reach/Instruct patient to call for assistance before getting out of bed/chair/stretcher/Non-slip footwear applied when patient is off stretcher/[place] to call system/Physically safe environment - no spills, clutter or unnecessary equipment/Purposeful proactive rounding/Room/bathroom lighting operational, light cord in reach

## 2023-05-27 LAB
BASOPHILS # BLD AUTO: 0.06 K/UL — SIGNIFICANT CHANGE UP (ref 0–0.2)
BASOPHILS NFR BLD AUTO: 0.3 % — SIGNIFICANT CHANGE UP (ref 0–2)
EOSINOPHIL # BLD AUTO: 0 K/UL — SIGNIFICANT CHANGE UP (ref 0–0.5)
EOSINOPHIL NFR BLD AUTO: 0 % — SIGNIFICANT CHANGE UP (ref 0–6)
HCT VFR BLD CALC: 42.7 % — SIGNIFICANT CHANGE UP (ref 34.5–45)
HGB BLD-MCNC: 14.3 G/DL — SIGNIFICANT CHANGE UP (ref 11.5–15.5)
IMM GRANULOCYTES NFR BLD AUTO: 0.6 % — SIGNIFICANT CHANGE UP (ref 0–0.9)
LYMPHOCYTES # BLD AUTO: 14.8 % — SIGNIFICANT CHANGE UP (ref 13–44)
LYMPHOCYTES # BLD AUTO: 2.76 K/UL — SIGNIFICANT CHANGE UP (ref 1–3.3)
MCHC RBC-ENTMCNC: 29.9 PG — SIGNIFICANT CHANGE UP (ref 27–34)
MCHC RBC-ENTMCNC: 33.5 GM/DL — SIGNIFICANT CHANGE UP (ref 32–36)
MCV RBC AUTO: 89.3 FL — SIGNIFICANT CHANGE UP (ref 80–100)
MONOCYTES # BLD AUTO: 0.7 K/UL — SIGNIFICANT CHANGE UP (ref 0–0.9)
MONOCYTES NFR BLD AUTO: 3.8 % — SIGNIFICANT CHANGE UP (ref 2–14)
NEUTROPHILS # BLD AUTO: 15 K/UL — HIGH (ref 1.8–7.4)
NEUTROPHILS NFR BLD AUTO: 80.5 % — HIGH (ref 43–77)
PLATELET # BLD AUTO: 318 K/UL — SIGNIFICANT CHANGE UP (ref 150–400)
RBC # BLD: 4.78 M/UL — SIGNIFICANT CHANGE UP (ref 3.8–5.2)
RBC # FLD: 13.9 % — SIGNIFICANT CHANGE UP (ref 10.3–14.5)
WBC # BLD: 18.64 K/UL — HIGH (ref 3.8–10.5)
WBC # FLD AUTO: 18.64 K/UL — HIGH (ref 3.8–10.5)

## 2023-05-27 PROCEDURE — 74019 RADEX ABDOMEN 2 VIEWS: CPT

## 2023-05-27 PROCEDURE — 76705 ECHO EXAM OF ABDOMEN: CPT

## 2023-05-27 PROCEDURE — 96375 TX/PRO/DX INJ NEW DRUG ADDON: CPT

## 2023-05-27 PROCEDURE — 83735 ASSAY OF MAGNESIUM: CPT

## 2023-05-27 PROCEDURE — 96376 TX/PRO/DX INJ SAME DRUG ADON: CPT

## 2023-05-27 PROCEDURE — 74177 CT ABD & PELVIS W/CONTRAST: CPT | Mod: MA

## 2023-05-27 PROCEDURE — 36415 COLL VENOUS BLD VENIPUNCTURE: CPT

## 2023-05-27 PROCEDURE — 83690 ASSAY OF LIPASE: CPT

## 2023-05-27 PROCEDURE — 99284 EMERGENCY DEPT VISIT MOD MDM: CPT | Mod: 25

## 2023-05-27 PROCEDURE — 96374 THER/PROPH/DIAG INJ IV PUSH: CPT

## 2023-05-27 PROCEDURE — 85025 COMPLETE CBC W/AUTO DIFF WBC: CPT

## 2023-05-27 PROCEDURE — 74177 CT ABD & PELVIS W/CONTRAST: CPT | Mod: 26,MA

## 2023-05-27 PROCEDURE — 80053 COMPREHEN METABOLIC PANEL: CPT

## 2023-05-27 PROCEDURE — 76705 ECHO EXAM OF ABDOMEN: CPT | Mod: 26

## 2023-05-27 RX ORDER — HALOPERIDOL DECANOATE 100 MG/ML
2.5 INJECTION INTRAMUSCULAR ONCE
Refills: 0 | Status: COMPLETED | OUTPATIENT
Start: 2023-05-27 | End: 2023-05-27

## 2023-05-27 RX ORDER — ONDANSETRON 8 MG/1
1 TABLET, FILM COATED ORAL
Qty: 4 | Refills: 0
Start: 2023-05-27

## 2023-05-27 RX ORDER — ACETAMINOPHEN 500 MG
1000 TABLET ORAL ONCE
Refills: 0 | Status: COMPLETED | OUTPATIENT
Start: 2023-05-27 | End: 2023-05-27

## 2023-05-27 RX ORDER — ONDANSETRON 8 MG/1
4 TABLET, FILM COATED ORAL ONCE
Refills: 0 | Status: COMPLETED | OUTPATIENT
Start: 2023-05-27 | End: 2023-05-27

## 2023-05-27 RX ADMIN — ONDANSETRON 4 MILLIGRAM(S): 8 TABLET, FILM COATED ORAL at 03:25

## 2023-05-27 RX ADMIN — HALOPERIDOL DECANOATE 2.5 MILLIGRAM(S): 100 INJECTION INTRAMUSCULAR at 06:15

## 2023-05-27 RX ADMIN — Medication 400 MILLIGRAM(S): at 04:00
